# Patient Record
Sex: FEMALE | Race: WHITE | NOT HISPANIC OR LATINO | URBAN - METROPOLITAN AREA
[De-identification: names, ages, dates, MRNs, and addresses within clinical notes are randomized per-mention and may not be internally consistent; named-entity substitution may affect disease eponyms.]

---

## 2023-11-27 ENCOUNTER — ANCILLARY PROCEDURE (OUTPATIENT)
Dept: RADIOLOGY | Facility: CLINIC | Age: 68
End: 2023-11-27
Payer: MEDICARE

## 2023-11-27 VITALS — WEIGHT: 160 LBS | BODY MASS INDEX: 27.31 KG/M2 | HEIGHT: 64 IN

## 2023-11-27 DIAGNOSIS — Z12.31 ENCOUNTER FOR SCREENING MAMMOGRAM FOR MALIGNANT NEOPLASM OF BREAST: ICD-10-CM

## 2023-11-27 PROCEDURE — 77063 BREAST TOMOSYNTHESIS BI: CPT

## 2023-11-29 ENCOUNTER — LAB (OUTPATIENT)
Dept: LAB | Facility: LAB | Age: 68
End: 2023-11-29
Payer: MEDICARE

## 2023-11-29 DIAGNOSIS — Z13.89 ENCOUNTER FOR SCREENING FOR OTHER DISORDER: ICD-10-CM

## 2023-11-29 DIAGNOSIS — E78.1 PURE HYPERGLYCERIDEMIA: Primary | ICD-10-CM

## 2023-11-29 LAB
ALBUMIN SERPL-MCNC: 4.3 G/DL (ref 3.5–5)
ALP BLD-CCNC: 49 U/L (ref 35–125)
ALT SERPL-CCNC: 8 U/L (ref 5–40)
ANION GAP SERPL CALC-SCNC: 11 MMOL/L
APPEARANCE UR: CLEAR
AST SERPL-CCNC: 13 U/L (ref 5–40)
BASOPHILS # BLD AUTO: 0.06 X10*3/UL (ref 0–0.1)
BASOPHILS NFR BLD AUTO: 1.1 %
BILIRUB SERPL-MCNC: 0.6 MG/DL (ref 0.1–1.2)
BILIRUB UR STRIP.AUTO-MCNC: NEGATIVE MG/DL
BUN SERPL-MCNC: 8 MG/DL (ref 8–25)
CALCIUM SERPL-MCNC: 9.6 MG/DL (ref 8.5–10.4)
CHLORIDE SERPL-SCNC: 105 MMOL/L (ref 97–107)
CHOLEST SERPL-MCNC: 279 MG/DL (ref 133–200)
CHOLEST/HDLC SERPL: 3.2 {RATIO}
CO2 SERPL-SCNC: 25 MMOL/L (ref 24–31)
COLOR UR: COLORLESS
CREAT SERPL-MCNC: 0.7 MG/DL (ref 0.4–1.6)
EOSINOPHIL # BLD AUTO: 0.12 X10*3/UL (ref 0–0.7)
EOSINOPHIL NFR BLD AUTO: 2.1 %
ERYTHROCYTE [DISTWIDTH] IN BLOOD BY AUTOMATED COUNT: 13.4 % (ref 11.5–14.5)
GFR SERPL CREATININE-BSD FRML MDRD: >90 ML/MIN/1.73M*2
GLUCOSE SERPL-MCNC: 88 MG/DL (ref 65–99)
GLUCOSE UR STRIP.AUTO-MCNC: NORMAL MG/DL
HCT VFR BLD AUTO: 42 % (ref 36–46)
HDLC SERPL-MCNC: 86 MG/DL
HGB BLD-MCNC: 13.4 G/DL (ref 12–16)
IMM GRANULOCYTES # BLD AUTO: 0.02 X10*3/UL (ref 0–0.7)
IMM GRANULOCYTES NFR BLD AUTO: 0.4 % (ref 0–0.9)
KETONES UR STRIP.AUTO-MCNC: NEGATIVE MG/DL
LDLC SERPL CALC-MCNC: 178 MG/DL (ref 65–130)
LEUKOCYTE ESTERASE UR QL STRIP.AUTO: ABNORMAL
LYMPHOCYTES # BLD AUTO: 1.84 X10*3/UL (ref 1.2–4.8)
LYMPHOCYTES NFR BLD AUTO: 32.5 %
MCH RBC QN AUTO: 30 PG (ref 26–34)
MCHC RBC AUTO-ENTMCNC: 31.9 G/DL (ref 32–36)
MCV RBC AUTO: 94 FL (ref 80–100)
MONOCYTES # BLD AUTO: 0.58 X10*3/UL (ref 0.1–1)
MONOCYTES NFR BLD AUTO: 10.2 %
NEUTROPHILS # BLD AUTO: 3.05 X10*3/UL (ref 1.2–7.7)
NEUTROPHILS NFR BLD AUTO: 53.7 %
NITRITE UR QL STRIP.AUTO: NEGATIVE
NRBC BLD-RTO: 0 /100 WBCS (ref 0–0)
PH UR STRIP.AUTO: 5.5 [PH]
PLATELET # BLD AUTO: 279 X10*3/UL (ref 150–450)
POTASSIUM SERPL-SCNC: 4.4 MMOL/L (ref 3.4–5.1)
PROT SERPL-MCNC: 6.5 G/DL (ref 5.9–7.9)
PROT UR STRIP.AUTO-MCNC: NEGATIVE MG/DL
RBC # BLD AUTO: 4.47 X10*6/UL (ref 4–5.2)
RBC # UR STRIP.AUTO: NEGATIVE /UL
RBC #/AREA URNS AUTO: NORMAL /HPF
SODIUM SERPL-SCNC: 141 MMOL/L (ref 133–145)
SP GR UR STRIP.AUTO: 1.01
TRIGL SERPL-MCNC: 76 MG/DL (ref 40–150)
UROBILINOGEN UR STRIP.AUTO-MCNC: NORMAL MG/DL
WBC # BLD AUTO: 5.7 X10*3/UL (ref 4.4–11.3)
WBC #/AREA URNS AUTO: NORMAL /HPF

## 2023-11-29 PROCEDURE — 81001 URINALYSIS AUTO W/SCOPE: CPT

## 2023-11-29 PROCEDURE — 80061 LIPID PANEL: CPT

## 2023-11-29 PROCEDURE — 85025 COMPLETE CBC W/AUTO DIFF WBC: CPT

## 2023-11-29 PROCEDURE — 36415 COLL VENOUS BLD VENIPUNCTURE: CPT

## 2023-11-29 PROCEDURE — 80053 COMPREHEN METABOLIC PANEL: CPT

## 2023-12-05 RX ORDER — TIZANIDINE 2 MG/1
2 TABLET ORAL EVERY 8 HOURS PRN
COMMUNITY
Start: 2023-06-27 | End: 2023-12-14 | Stop reason: ALTCHOICE

## 2023-12-05 RX ORDER — ALENDRONATE SODIUM 10 MG/1
10 TABLET ORAL
COMMUNITY
End: 2023-12-14 | Stop reason: ALTCHOICE

## 2023-12-05 RX ORDER — NAPROXEN 500 MG/1
500 TABLET ORAL 2 TIMES DAILY PRN
COMMUNITY
Start: 2023-06-24 | End: 2023-12-14

## 2023-12-07 ENCOUNTER — APPOINTMENT (OUTPATIENT)
Dept: PRIMARY CARE | Facility: CLINIC | Age: 68
End: 2023-12-07
Payer: MEDICARE

## 2023-12-13 PROBLEM — M81.0 AGE-RELATED OSTEOPOROSIS WITHOUT CURRENT PATHOLOGICAL FRACTURE: Status: ACTIVE | Noted: 2023-12-13

## 2023-12-13 PROBLEM — E78.00 HYPERCHOLESTEROLEMIA: Status: ACTIVE | Noted: 2023-12-13

## 2023-12-13 NOTE — PROGRESS NOTES
Subjective   Patient ID: Clara Child is a 68 y.o. female who presents for Medicare Annual Wellness Visit Subsequent.    [facility]    Presents for Annual Wellness Visit. PMHx, FMHx, SHx, and Social history reviewed and updated in chart.  Advanced Care planning reviewed.  Self assessment of Health - good  Issues with ADLs - No problems with bathing, dressing or walking.  Issues with IADLs - No problems with managing finances or medications. No problems with shopping or basic home maintenance and housework.  Issues with home safety - none    # 1. CLARA is seen for her comprehensive physical exam. She continues to work  20-30 hours per week at [employer]. Hopes to work another 10 years. She was diagnosed with T2N0M0 infiltrating ductal carcinoma of the left breast 2012. She was successfully treated with chemotherapy and radiation without complication. She no longer sees oncologist. She had a normal mammogram last month.   She continues to exercise daily.  She feels well. Her dad has colon cancer and has done well. It was diagnosed when he was 80. She had colonoscopy 2020 with 5 year follow up. She had  her Shingrix vaccine completed. She also has had both of her pneumonia vaccines.  Pre draw is reviewed and discussed.   # 2. CLARA is seen today for follow-up of Hypercholesterolemia. She has been on atorvastatin in the past but has been working on her diet and she has maintained decent control. Numbers are elevated this year.  # 3. She is also here to follow-up on osteoporosis. She was started on Fosamax in 2019 and she stopped it because she did not like the difficulty with not eating around the time that she took the medication. She was not interested in any further  treatment .    Review of Systems  Review of Systems Constitutional Symptoms: She is negative for headaches, sleep disturbance.   Cardiovascular: She is negative for chest pain/pressure, palpitations, edema.   Respiratory: She is negative for shortness of  "breath, dyspnea on exertion, coughing.   Breast: She is negative for tenderness or masses   Gastrointestinal: She is negative for indigestion, abdominal pain, blood in stool. She notes that sometimes she skips a day with a bowel movement. She drinks a lot a water.  Female Genitourinary: She is negative for frequency, nocturia, pelvic pain, vaginal discharge.   Musculoskeletal: She is negative for joint pain, myalgias, cramps.   Integumentary: She is negative for skin trouble or rash.   Neurological: She is negative for headache, numbness, tingling.   Psychiatric: She is negative for depression, anxiety.   Endocrine: She is negative for polyuria, polydipsia.    Objective   /88   Pulse 71   Ht 1.651 m (5' 5\")   Wt 73.9 kg (163 lb)   SpO2 96%   BMI 27.12 kg/m²     Physical Exam  General Appearance: MIKE is in no acute distress. She is awake and alert  Head: MIKE's hair pattern is normal for patients age .   Eyes: PERRLA, EOMI, conjunctive, sclerae clear. Vision appears normal in both eyes.  Ears, Nose, Mouth, Throat:   EARS: External bilateral ears reveal normal helix, tragus and ear lobe. Both canals are normal. Tympanic membranes are clear and intact. Hearing is grossly normal   She has a paper mask in place.  Neck: Inspection of the neck reveals no masses or JVD.   Palpation shows normal thyroid gland. No carotid bruit present.   Anterior cervical lymph node chains are unremarkable. Posterior chains are unremarkable.  Chest: Lungs are clear to auscultation without wheezes, rales, or rhonchi.  Cardiovascular: Heart is RRR, normal S1, S2. No murmurs or gallops. DP pulses are present. Extremities: No edema  Breast: Breasts are normal and symmetric . Breast tissue is normal with no significant masses. Axilla has no lymphadenopathy.   Abdomen: soft, nontender, no organomegaly or masses.   Lymph Nodes: Palpation of the inguinal area are within normal limit.  Musculoskeletal: Extremities: Full Range of motion " and strength throughout.   Skin: Skin has no bruising, rash, eruptions, or abnormal appearing lesions. She has a tattoo on her posterior left shoulder in memory of her daughter who  in 2017.  Neurological: Intact and non-focal. Cranial nerves II - XII are grossly intact. Motor exams reveals normal tone and strength ,  Psychiatric:  Patient's mood is normal with good eye contact. Affect is appropriate.    Assessment/Plan   Diagnoses and all orders for this visit:  Well adult exam  Hypercholesterolemia  -     CT cardiac scoring wo IV contrast; Future  Age-related osteoporosis without current pathological fracture  Abnormal EKG  -     ECG 12 Lead  Postmenopausal status (age-related) (natural)  -     XR DEXA bone density; Future  Other orders  -     Follow Up In Primary Care - Medicare Annual; Future  We discussed restarting the atorvastatin.  As an alternative we discussed doing a coronary calcium score.  She would like to do the coronary calcium score and hold off on medications at this time.  She is following a low-fat diet.  She agrees to get a DEXA scan and consider Prolia which we discussed in the office.  I will notify her of these results and we can discuss these over the phone.  Diet and activity was discussed.  She should follow-up in 1 year or as needed sooner.

## 2023-12-14 ENCOUNTER — OFFICE VISIT (OUTPATIENT)
Dept: PRIMARY CARE | Facility: CLINIC | Age: 68
End: 2023-12-14
Payer: MEDICARE

## 2023-12-14 VITALS
HEIGHT: 65 IN | OXYGEN SATURATION: 96 % | HEART RATE: 71 BPM | DIASTOLIC BLOOD PRESSURE: 88 MMHG | SYSTOLIC BLOOD PRESSURE: 146 MMHG | BODY MASS INDEX: 27.16 KG/M2 | WEIGHT: 163 LBS

## 2023-12-14 DIAGNOSIS — M81.0 AGE-RELATED OSTEOPOROSIS WITHOUT CURRENT PATHOLOGICAL FRACTURE: ICD-10-CM

## 2023-12-14 DIAGNOSIS — R94.31 ABNORMAL EKG: ICD-10-CM

## 2023-12-14 DIAGNOSIS — Z78.0 POSTMENOPAUSAL STATUS (AGE-RELATED) (NATURAL): ICD-10-CM

## 2023-12-14 DIAGNOSIS — Z00.00 WELL ADULT EXAM: Primary | ICD-10-CM

## 2023-12-14 DIAGNOSIS — E78.00 HYPERCHOLESTEROLEMIA: ICD-10-CM

## 2023-12-14 PROCEDURE — 1159F MED LIST DOCD IN RCRD: CPT | Performed by: FAMILY MEDICINE

## 2023-12-14 PROCEDURE — 1036F TOBACCO NON-USER: CPT | Performed by: FAMILY MEDICINE

## 2023-12-14 PROCEDURE — 1160F RVW MEDS BY RX/DR IN RCRD: CPT | Performed by: FAMILY MEDICINE

## 2023-12-14 PROCEDURE — 1126F AMNT PAIN NOTED NONE PRSNT: CPT | Performed by: FAMILY MEDICINE

## 2023-12-14 PROCEDURE — 99213 OFFICE O/P EST LOW 20 MIN: CPT | Performed by: FAMILY MEDICINE

## 2023-12-14 PROCEDURE — 93000 ELECTROCARDIOGRAM COMPLETE: CPT | Performed by: FAMILY MEDICINE

## 2023-12-14 PROCEDURE — G0439 PPPS, SUBSEQ VISIT: HCPCS | Performed by: FAMILY MEDICINE

## 2023-12-14 ASSESSMENT — PATIENT HEALTH QUESTIONNAIRE - PHQ9
SUM OF ALL RESPONSES TO PHQ9 QUESTIONS 1 AND 2: 0
2. FEELING DOWN, DEPRESSED OR HOPELESS: NOT AT ALL
1. LITTLE INTEREST OR PLEASURE IN DOING THINGS: NOT AT ALL

## 2023-12-14 ASSESSMENT — PAIN SCALES - GENERAL: PAINLEVEL: 0-NO PAIN

## 2023-12-14 NOTE — PATIENT INSTRUCTIONS
Please schedule the coronary calcium score and the bone density as we discussed.  We will discuss those results once they are available.  Have a Jacqueline Morenos!

## 2023-12-20 ENCOUNTER — ANCILLARY PROCEDURE (OUTPATIENT)
Dept: RADIOLOGY | Facility: CLINIC | Age: 68
End: 2023-12-20
Payer: MEDICARE

## 2023-12-20 DIAGNOSIS — Z78.0 POSTMENOPAUSAL STATUS (AGE-RELATED) (NATURAL): ICD-10-CM

## 2023-12-20 PROCEDURE — 77080 DXA BONE DENSITY AXIAL: CPT

## 2023-12-20 PROCEDURE — 77080 DXA BONE DENSITY AXIAL: CPT | Performed by: RADIOLOGY

## 2023-12-26 ENCOUNTER — TELEPHONE (OUTPATIENT)
Dept: PRIMARY CARE | Facility: CLINIC | Age: 68
End: 2023-12-26
Payer: MEDICARE

## 2023-12-26 NOTE — TELEPHONE ENCOUNTER
Spoke with patient re: start Prolia.      [employer] benefits verfication is currently closed through 1/7/24.  Will start verify process on 1/1/2024 and contact patient when benefits search is complete.

## 2023-12-27 ENCOUNTER — HOSPITAL ENCOUNTER (OUTPATIENT)
Dept: RADIOLOGY | Facility: HOSPITAL | Age: 68
Discharge: HOME | End: 2023-12-27
Payer: MEDICARE

## 2023-12-27 DIAGNOSIS — E78.00 HYPERCHOLESTEROLEMIA: ICD-10-CM

## 2023-12-27 PROCEDURE — 75571 CT HRT W/O DYE W/CA TEST: CPT

## 2024-01-16 DIAGNOSIS — M81.0 OSTEOPOROSIS, POSTMENOPAUSAL: Primary | ICD-10-CM

## 2024-01-18 ENCOUNTER — LAB (OUTPATIENT)
Dept: LAB | Facility: LAB | Age: 69
End: 2024-01-18
Payer: MEDICARE

## 2024-01-18 DIAGNOSIS — M81.0 OSTEOPOROSIS, POSTMENOPAUSAL: ICD-10-CM

## 2024-01-18 DIAGNOSIS — M81.0 OSTEOPOROSIS, POST-MENOPAUSAL: ICD-10-CM

## 2024-01-18 DIAGNOSIS — M81.0 OSTEOPOROSIS, POST-MENOPAUSAL: Primary | ICD-10-CM

## 2024-01-18 LAB
ANION GAP SERPL CALC-SCNC: 9 MMOL/L
BUN SERPL-MCNC: 13 MG/DL (ref 8–25)
CALCIUM SERPL-MCNC: 9.7 MG/DL (ref 8.5–10.4)
CHLORIDE SERPL-SCNC: 100 MMOL/L (ref 97–107)
CO2 SERPL-SCNC: 27 MMOL/L (ref 24–31)
CREAT SERPL-MCNC: 0.8 MG/DL (ref 0.4–1.6)
EGFRCR SERPLBLD CKD-EPI 2021: 79 ML/MIN/1.73M*2
GLUCOSE SERPL-MCNC: 94 MG/DL (ref 65–99)
POTASSIUM SERPL-SCNC: 5.2 MMOL/L (ref 3.4–5.1)
SODIUM SERPL-SCNC: 136 MMOL/L (ref 133–145)

## 2024-01-18 PROCEDURE — 36415 COLL VENOUS BLD VENIPUNCTURE: CPT

## 2024-01-18 PROCEDURE — 80048 BASIC METABOLIC PNL TOTAL CA: CPT

## 2024-01-18 PROCEDURE — RXMED WILLOW AMBULATORY MEDICATION CHARGE

## 2024-01-22 ENCOUNTER — PHARMACY VISIT (OUTPATIENT)
Dept: PHARMACY | Facility: CLINIC | Age: 69
End: 2024-01-22
Payer: COMMERCIAL

## 2024-01-26 ENCOUNTER — CLINICAL SUPPORT (OUTPATIENT)
Dept: PRIMARY CARE | Facility: CLINIC | Age: 69
End: 2024-01-26
Payer: MEDICARE

## 2024-01-26 ENCOUNTER — TELEPHONE (OUTPATIENT)
Dept: PRIMARY CARE | Facility: CLINIC | Age: 69
End: 2024-01-26

## 2024-01-26 VITALS — SYSTOLIC BLOOD PRESSURE: 114 MMHG | TEMPERATURE: 97.6 F | DIASTOLIC BLOOD PRESSURE: 82 MMHG

## 2024-01-26 DIAGNOSIS — M81.0 AGE-RELATED OSTEOPOROSIS WITHOUT CURRENT PATHOLOGICAL FRACTURE: Primary | ICD-10-CM

## 2024-01-26 PROCEDURE — 96372 THER/PROPH/DIAG INJ SC/IM: CPT | Performed by: FAMILY MEDICINE

## 2024-06-14 ENCOUNTER — TELEPHONE (OUTPATIENT)
Dept: PRIMARY CARE | Facility: CLINIC | Age: 69
End: 2024-06-14
Payer: MEDICARE

## 2024-06-14 DIAGNOSIS — Z12.31 VISIT FOR SCREENING MAMMOGRAM: Primary | ICD-10-CM

## 2024-06-14 DIAGNOSIS — E78.00 HYPERCHOLESTEROLEMIA: ICD-10-CM

## 2024-07-01 ENCOUNTER — TELEMEDICINE (OUTPATIENT)
Dept: PHARMACY | Facility: HOSPITAL | Age: 69
End: 2024-07-01
Payer: MEDICARE

## 2024-07-01 ENCOUNTER — SPECIALTY PHARMACY (OUTPATIENT)
Dept: PHARMACY | Facility: CLINIC | Age: 69
End: 2024-07-01

## 2024-07-01 DIAGNOSIS — M81.0 OSTEOPOROSIS, POSTMENOPAUSAL: Primary | ICD-10-CM

## 2024-07-01 DIAGNOSIS — M81.0 AGE-RELATED OSTEOPOROSIS WITHOUT CURRENT PATHOLOGICAL FRACTURE: Primary | ICD-10-CM

## 2024-07-01 DIAGNOSIS — M81.0 OSTEOPOROSIS, POST-MENOPAUSAL: ICD-10-CM

## 2024-07-01 DIAGNOSIS — M81.0 AGE-RELATED OSTEOPOROSIS WITHOUT CURRENT PATHOLOGICAL FRACTURE: ICD-10-CM

## 2024-07-01 PROCEDURE — RXMED WILLOW AMBULATORY MEDICATION CHARGE

## 2024-07-01 NOTE — PROGRESS NOTES
Please review for internal Ventures Assistance Fund (VAF) eligibility. Prescriptions have been sent to [facility].     Clara Child  1955   [id number]  [phone]   [email or web address]  Delivery Preference (if known): MAIL  Priority:  Hold Medication until [facility] approved/denied  Filing Status: Patient does file taxes  Household size: 1  Financial Documents To Be Collected By: Documents attached to email  Medication(s):    Prolia    *I have confirmed the above medications are listed on the current [facility] formulary: [email or web address]    I acknowledge the [facility] staff will further reach out to the patient to confirm additional details as needed, including but not limited to collecting financial documentation, confirming delivery information, obtaining payment method for non-VAF medications.      Sandra Robbins, [facility]

## 2024-07-01 NOTE — PROGRESS NOTES
MEDICATION MANAGEMENT    Clara Child is a 69 y.o. female who was referred by Roro Bailey MD to complete medication reconciliation and review with the clinical pharmacist.  A comprehensive medication review was completed with the patient via telephone today.  With patient's permission, this is a Telemedicine visit with audio. Provider located at office address. Patient located at their home address. All issues as documented below were discussed and addressed but limited physical exam was performed. If it was felt that the patient should be evaluated via face-to-face office appointment(s) they were directed to appropriate location.  Patient reports financial barrier with current medication.      MEDICATION HISTORY  No Known Allergies  Current Outpatient Medications on File Prior to Visit   Medication Sig Dispense Refill    [DISCONTINUED] denosumab (Prolia) 60 mg/mL syringe Inject 1 mL (60 mg total) under the skin every 6 months.  To be administered in provider's office. 1 mL 1     No current facility-administered medications on file prior to visit.        Patient Assistance Screening (VAF)  Patient verbally reports monthly or yearly income which is less than 400% federal poverty level.  Application for program has been submitted for the following medications:     Prolia    Patient has been informed that program team will be reaching out to them to discuss necessary documentation, instructed to answer phone/return voicemail.   Patient aware this process may take up to 6 weeks.   If approved medication must be filled through [facility] pharmacy and may be picked up or mailed to patient.       LABS  There were no vitals taken for this visit.   Lab Results   Component Value Date    HGBA1C 5.3 09/27/2021    HGBA1C 5.6 09/13/2018     Lab Results   Component Value Date    BILITOT 0.6 11/29/2023    CALCIUM 9.7 01/18/2024    CO2 27 01/18/2024     01/18/2024    CREATININE 0.80 01/18/2024    GLUCOSE 94  01/18/2024    ALKPHOS 49 11/29/2023    K 5.2 (H) 01/18/2024    PROT 6.5 11/29/2023     01/18/2024    AST 13 11/29/2023    ALT 8 11/29/2023    BUN 13 01/18/2024    ANIONGAP 9 01/18/2024    MG 1.7 04/29/2021    PHOS 2.7 04/29/2021    ALBUMIN 4.3 11/29/2023     Lab Results   Component Value Date    TRIG 76 11/29/2023    CHOL 279 (H) 11/29/2023    LDLCALC 178 (H) 11/29/2023    HDL 86.0 11/29/2023         Assessment/Plan    Comments/Recommendations to PCP:  Reconciled medications with patient via telephone today.  Reviewed instructions, MOA, SE and dose for Prolia   Patient verbalizes understanding regarding plan of care and all questions answered   4.   Pt will follow up with PCP as scheduled          Sandra Robbins [facility]    Verbal consent to manage patient's drug therapy was obtained from the patient and/or an individual authorized to act on behalf of a patient. They were informed they may decline to participate or withdraw from participation in pharmacy services at any time.

## 2024-07-01 NOTE — PROGRESS NOTES
Called pt to schedule Prolia injection.  [facility]  Pt will bring 2023 tax forms.

## 2024-07-01 NOTE — PROGRESS NOTES
PROLIA NOTES  Prolia Injection  , M81.0    Clara Child is a 69 y.o. female who was referred by Roro Bailey MD for in-office injection of Prolia.   Spoke with patiet via telephone today.  Patient verbally consented to proceed with Osteoporosis treatment/Prolia.     Calcium   Date Value Ref Range Status   01/18/2024 9.7 8.5 - 10.4 mg/dL Final     eGFR   Date Value Ref Range Status   01/18/2024 79 >60 mL/min/1.73m*2 Final     Comment:     Calculations of estimated GFR are performed using the 2021 CKD-EPI Study Refit equation without the race variable for the IDMS-Traceable creatinine methods.  https://jasn.asnjournals.org/content/early/2021/09/22/ASN.9143264771        Last DEXA scan:  12/14/2023    FINDINGS:  LEFT FEMUR -TOTAL  Bone Mineral Density:0.724 g/cm2.  T-Score -2.3  Z-Score -1.0  % change vs Previous : na. % change vs Baseline baseline.      LEFT FEMUR -NECK  Bone Mineral Density:  0.672 g/cm2.  T-Score -2.6   Z-Score-1.1  % change vs Previous : na. % change vs Baseline baseline.      SPINE L1-L4  Bone Mineral Density:0.890 g/cm2.  T-Score -2.3  Z-Score -1.0  % change vs Previous : na. % change vs Baseline baseline.      World Health Organization (WHO) criteria for post-menopausal,   Women:  Normal:         T-score at or above -1 SD  Osteopenia:   T-score between -1 and -2.5 SD  Osteoporosis: T-score at or below -2.5 SD       10-year Fracture Risk:  Major Osteoporotic Fracture  15.8  Hip Fracture        Next dose due: 07/26/24      PA needed? No    Prolia injection via: [facility]  Authorization number: n/a    Pharmacy to be used: [facility]     Bloodwork ordered? Yes    Date: 07/01/24  Medication ordered? Yes    Date: 07/01/24  Medication Received? No    Appointment scheduled? Yes    Date: 07/26/24 at CPE      Other Notes:  Discussed cost, MOA, potential SE, pre-injection bloodwork, post injection bloodwork, instructed to call office after injection with any signs of hypocalcemia.

## 2024-07-02 ENCOUNTER — PHARMACY VISIT (OUTPATIENT)
Dept: PHARMACY | Facility: CLINIC | Age: 69
End: 2024-07-02
Payer: COMMERCIAL

## 2024-07-26 ENCOUNTER — APPOINTMENT (OUTPATIENT)
Dept: PRIMARY CARE | Facility: CLINIC | Age: 69
End: 2024-07-26
Payer: MEDICARE

## 2024-07-26 VITALS — DIASTOLIC BLOOD PRESSURE: 72 MMHG | SYSTOLIC BLOOD PRESSURE: 116 MMHG | TEMPERATURE: 97.5 F

## 2024-07-26 DIAGNOSIS — M81.0 AGE-RELATED OSTEOPOROSIS WITHOUT CURRENT PATHOLOGICAL FRACTURE: Primary | ICD-10-CM

## 2024-07-26 PROCEDURE — 96372 THER/PROPH/DIAG INJ SC/IM: CPT | Performed by: FAMILY MEDICINE

## 2024-07-26 NOTE — PROGRESS NOTES
Prolia Injection  , M81.0    Clara Child is a 69 y.o. female who was referred by Roro Bailey MD for in-office injection of Prolia.   Patient verbally consented to receive Prolia.      Injection given today and documented in medical record.  Patient was provided with Prolia REMS handout and instructed to call office with any signs of hypocalcemia.    Prolia injection supplied by patient - medication charge IS NOT needed     [unfilled]    Lab Results       Component                Value               Date

## 2024-08-13 ENCOUNTER — LAB (OUTPATIENT)
Dept: LAB | Facility: LAB | Age: 69
End: 2024-08-13
Payer: MEDICARE

## 2024-08-13 DIAGNOSIS — M81.0 OSTEOPOROSIS, POST-MENOPAUSAL: ICD-10-CM

## 2024-08-13 DIAGNOSIS — M81.0 OSTEOPOROSIS, POSTMENOPAUSAL: ICD-10-CM

## 2024-08-13 LAB
25(OH)D3 SERPL-MCNC: 27 NG/ML (ref 31–100)
ANION GAP SERPL CALC-SCNC: 11 MMOL/L
BUN SERPL-MCNC: 11 MG/DL (ref 8–25)
CALCIUM SERPL-MCNC: 9.6 MG/DL (ref 8.5–10.4)
CHLORIDE SERPL-SCNC: 105 MMOL/L (ref 97–107)
CO2 SERPL-SCNC: 26 MMOL/L (ref 24–31)
CREAT SERPL-MCNC: 0.7 MG/DL (ref 0.4–1.6)
EGFRCR SERPLBLD CKD-EPI 2021: >90 ML/MIN/1.73M*2
GLUCOSE SERPL-MCNC: 83 MG/DL (ref 65–99)
POTASSIUM SERPL-SCNC: 5.2 MMOL/L (ref 3.4–5.1)
SODIUM SERPL-SCNC: 142 MMOL/L (ref 133–145)

## 2024-08-13 PROCEDURE — 82306 VITAMIN D 25 HYDROXY: CPT

## 2024-08-13 PROCEDURE — 80048 BASIC METABOLIC PNL TOTAL CA: CPT

## 2024-08-13 PROCEDURE — 36415 COLL VENOUS BLD VENIPUNCTURE: CPT

## 2024-08-30 ENCOUNTER — DOCUMENTATION WITH CHARGES (OUTPATIENT)
Dept: PRIMARY CARE | Facility: CLINIC | Age: 69
End: 2024-08-30

## 2024-11-27 ENCOUNTER — LAB (OUTPATIENT)
Dept: LAB | Facility: LAB | Age: 69
End: 2024-11-27
Payer: MEDICARE

## 2024-11-27 ENCOUNTER — HOSPITAL ENCOUNTER (OUTPATIENT)
Dept: RADIOLOGY | Facility: CLINIC | Age: 69
End: 2024-11-27
Payer: MEDICARE

## 2024-11-27 DIAGNOSIS — E78.00 HYPERCHOLESTEROLEMIA: ICD-10-CM

## 2024-11-27 LAB
ALBUMIN SERPL BCP-MCNC: 4 G/DL (ref 3.4–5)
ALP SERPL-CCNC: 29 U/L (ref 33–136)
ALT SERPL W P-5'-P-CCNC: 9 U/L (ref 7–45)
ANION GAP SERPL CALCULATED.3IONS-SCNC: 8 MMOL/L (ref 10–20)
APPEARANCE UR: CLEAR
AST SERPL W P-5'-P-CCNC: 13 U/L (ref 9–39)
BASOPHILS # BLD AUTO: 0.08 X10*3/UL (ref 0–0.1)
BASOPHILS NFR BLD AUTO: 1.2 %
BILIRUB SERPL-MCNC: 0.6 MG/DL (ref 0–1.2)
BILIRUB UR STRIP.AUTO-MCNC: NEGATIVE MG/DL
BUN SERPL-MCNC: 14 MG/DL (ref 6–23)
CALCIUM SERPL-MCNC: 9.1 MG/DL (ref 8.6–10.3)
CHLORIDE SERPL-SCNC: 106 MMOL/L (ref 98–107)
CHOLEST SERPL-MCNC: 232 MG/DL (ref 0–199)
CHOLEST/HDLC SERPL: 3.4 {RATIO}
CO2 SERPL-SCNC: 28 MMOL/L (ref 21–32)
COLOR UR: COLORLESS
CREAT SERPL-MCNC: 0.79 MG/DL (ref 0.5–1.05)
EGFRCR SERPLBLD CKD-EPI 2021: 80 ML/MIN/1.73M*2
EOSINOPHIL # BLD AUTO: 0.19 X10*3/UL (ref 0–0.4)
EOSINOPHIL NFR BLD AUTO: 3 %
ERYTHROCYTE [DISTWIDTH] IN BLOOD BY AUTOMATED COUNT: 13.7 % (ref 11.5–14.5)
GLUCOSE SERPL-MCNC: 79 MG/DL (ref 74–99)
GLUCOSE UR STRIP.AUTO-MCNC: NORMAL MG/DL
HCT VFR BLD AUTO: 42.4 % (ref 36–46)
HDLC SERPL-MCNC: 68.7 MG/DL
HGB BLD-MCNC: 13.6 G/DL (ref 12–16)
IMM GRANULOCYTES # BLD AUTO: 0.02 X10*3/UL (ref 0–0.5)
IMM GRANULOCYTES NFR BLD AUTO: 0.3 % (ref 0–0.9)
KETONES UR STRIP.AUTO-MCNC: NEGATIVE MG/DL
LDLC SERPL CALC-MCNC: 148 MG/DL
LEUKOCYTE ESTERASE UR QL STRIP.AUTO: ABNORMAL
LYMPHOCYTES # BLD AUTO: 2.26 X10*3/UL (ref 0.8–3)
LYMPHOCYTES NFR BLD AUTO: 35.2 %
MCH RBC QN AUTO: 30 PG (ref 26–34)
MCHC RBC AUTO-ENTMCNC: 32.1 G/DL (ref 32–36)
MCV RBC AUTO: 93 FL (ref 80–100)
MONOCYTES # BLD AUTO: 0.61 X10*3/UL (ref 0.05–0.8)
MONOCYTES NFR BLD AUTO: 9.5 %
NEUTROPHILS # BLD AUTO: 3.26 X10*3/UL (ref 1.6–5.5)
NEUTROPHILS NFR BLD AUTO: 50.8 %
NITRITE UR QL STRIP.AUTO: NEGATIVE
NON HDL CHOLESTEROL: 163 MG/DL (ref 0–149)
NRBC BLD-RTO: 0 /100 WBCS (ref 0–0)
PH UR STRIP.AUTO: 5.5 [PH]
PLATELET # BLD AUTO: 276 X10*3/UL (ref 150–450)
POTASSIUM SERPL-SCNC: 4.4 MMOL/L (ref 3.5–5.3)
PROT SERPL-MCNC: 6.2 G/DL (ref 6.4–8.2)
PROT UR STRIP.AUTO-MCNC: NEGATIVE MG/DL
RBC # BLD AUTO: 4.54 X10*6/UL (ref 4–5.2)
RBC # UR STRIP.AUTO: NEGATIVE /UL
RBC #/AREA URNS AUTO: ABNORMAL /HPF
SODIUM SERPL-SCNC: 138 MMOL/L (ref 136–145)
SP GR UR STRIP.AUTO: 1.01
TRANS CELLS #/AREA UR COMP ASSIST: ABNORMAL /HPF
TRIGL SERPL-MCNC: 76 MG/DL (ref 0–149)
UROBILINOGEN UR STRIP.AUTO-MCNC: NORMAL MG/DL
VLDL: 15 MG/DL (ref 0–40)
WBC # BLD AUTO: 6.4 X10*3/UL (ref 4.4–11.3)
WBC #/AREA URNS AUTO: ABNORMAL /HPF

## 2024-11-27 PROCEDURE — 80061 LIPID PANEL: CPT

## 2024-11-27 PROCEDURE — 80053 COMPREHEN METABOLIC PANEL: CPT

## 2024-11-27 PROCEDURE — 36415 COLL VENOUS BLD VENIPUNCTURE: CPT

## 2024-11-27 PROCEDURE — 81001 URINALYSIS AUTO W/SCOPE: CPT

## 2024-11-27 PROCEDURE — 85025 COMPLETE CBC W/AUTO DIFF WBC: CPT

## 2024-12-05 ENCOUNTER — HOSPITAL ENCOUNTER (OUTPATIENT)
Dept: RADIOLOGY | Facility: CLINIC | Age: 69
Discharge: HOME | End: 2024-12-05
Payer: MEDICARE

## 2024-12-05 DIAGNOSIS — Z12.31 VISIT FOR SCREENING MAMMOGRAM: ICD-10-CM

## 2024-12-05 PROCEDURE — 77067 SCR MAMMO BI INCL CAD: CPT

## 2024-12-12 NOTE — PROGRESS NOTES
Subjective   Reason for Visit: Clara Child is an 69 y.o. female here for a Medicare Wellness visit.     Past Medical, Surgical, and Family History reviewed and updated in chart.    Reviewed all medications by prescribing practitioner or clinical pharmacist (such as prescriptions, OTCs, herbal therapies and supplements) and documented in the medical record.    HPI    Patient Care Team:  Roro Bailey MD as PCP - General (Family Medicine)  Roro Bailey MD as PCP - [employer] PCP     # 1. CLARA is seen for her comprehensive physical exam.  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  Enjoys her job and hopes to keep working for another 10 years.   She was diagnosed with T2N0M0 infiltrating ductal carcinoma of the left breast 2012. She was successfully treated with chemotherapy and radiation without complication. She no longer sees oncologist.     She had a coronary calcium score of less than 1 in 2023.  She had a normal mammogram earlier this month.  She feels well. Her dad had colon cancer . She had colonoscopy 2020 with 5 year follow up. She had her Shingrix vaccine.    Pre draw is reviewed and discussed.   # 2. CLARA is seen today for follow-up of Hypercholesterolemia. She has been on atorvastatin in the past but has been working on her diet and she has maintained decent control.  Cholesterol ratio was 3.4.  # 3. She is also here to follow-up on osteoporosis.  DEXA scan last year showed osteoporosis.  She was started on Prolia because she had been intolerant to Fosamax in the past.    Review of Systems   Constitutional Symptoms: She is negative for headaches, sleep disturbance.   Cardiovascular: She is negative for chest pain, palpitations, edema.   Respiratory: She is negative for shortness of breath, dyspnea on exertion, coughing.   Breast: She is negative for tenderness or masses   Gastrointestinal: She is negative for indigestion, abdominal pain, blood  "in stool.  No change in bowel habits.  Female Genitourinary: She is negative for frequency, nocturia  Musculoskeletal: She is negative for joint pain, myalgias, cramps.   Integumentary: She is negative for skin trouble or rash.   Neurological: She is negative for headache, numbness, tingling.   Psychiatric: She is negative for depression, anxiety.   Endocrine: She is negative for polyuria, polydipsia.     Objective   Vitals:  /64 (BP Location: Left arm)   Pulse 67   Ht 1.64 m (5' 4.57\")   Wt 76.7 kg (169 lb)   SpO2 96%   BMI 28.50 kg/m²       Physical Exam  General Appearance: MIKE is in no acute distress. She is awake and alert  Eyes: PERRLA, EOMI, conjunctive, sclerae clear.   Ears, Nose, Mouth, Throat:   EARS: External bilateral ears reveal normal helix, tragus and ear lobe. Both canals are normal. Tympanic membranes are clear and intact.   Nose is noncongested.  Throat is noninjected and without exudate.  Neck: Inspection of the neck reveals no masses or JVD.   Palpation shows normal thyroid gland. No carotid bruit present.   No cervical adenopathy.  Chest: Lungs are clear to auscultation without wheezes, rales, or rhonchi.  Cardiovascular: Heart is RRR, normal S1, S2. No murmurs or gallops. DP pulses are present. Extremities: No edema  Breast: Breasts are normal and symmetric . Breast tissue is normal with no significant masses. Axilla has no lymphadenopathy.   Abdomen: soft, nontender, no organomegaly or masses.   Lymph Nodes: Palpation of the inguinal area are within normal limit.  Musculoskeletal: Extremities: Full Range of motion and strength throughout.   Skin: Skin has no bruising, rash, eruptions, or abnormal appearing lesions. She has a tattoo on her posterior left shoulder in memory of her daughter who  in 2017.  Neurological: Intact and non-focal. Cranial nerves II - XII are grossly intact. Motor exams reveals normal tone and strength ,  Psychiatric: Patient's mood is normal with good " eye contact. Affect is appropriate.     Assessment & Plan  Well adult exam  Diet and activity briefly discussed.     Follow-up in 1 year or as needed sooner.  Hypercholesterolemia  She should continue a low-fat diet.       Age-related osteoporosis without current pathological fracture  She will continue the Prolia for 5 years.

## 2024-12-17 ENCOUNTER — TELEPHONE (OUTPATIENT)
Dept: PRIMARY CARE | Facility: CLINIC | Age: 69
End: 2024-12-17

## 2024-12-17 ENCOUNTER — APPOINTMENT (OUTPATIENT)
Dept: PRIMARY CARE | Facility: CLINIC | Age: 69
End: 2024-12-17
Payer: MEDICARE

## 2024-12-17 VITALS
OXYGEN SATURATION: 96 % | WEIGHT: 169 LBS | BODY MASS INDEX: 28.16 KG/M2 | DIASTOLIC BLOOD PRESSURE: 64 MMHG | HEIGHT: 65 IN | SYSTOLIC BLOOD PRESSURE: 104 MMHG | HEART RATE: 67 BPM

## 2024-12-17 DIAGNOSIS — E78.00 HYPERCHOLESTEROLEMIA: ICD-10-CM

## 2024-12-17 DIAGNOSIS — M81.0 AGE-RELATED OSTEOPOROSIS WITHOUT CURRENT PATHOLOGICAL FRACTURE: ICD-10-CM

## 2024-12-17 DIAGNOSIS — Z00.00 WELL ADULT EXAM: Primary | ICD-10-CM

## 2024-12-17 DIAGNOSIS — Z00.00 WELL ADULT EXAM: ICD-10-CM

## 2024-12-17 PROCEDURE — 1160F RVW MEDS BY RX/DR IN RCRD: CPT | Performed by: FAMILY MEDICINE

## 2024-12-17 PROCEDURE — 3008F BODY MASS INDEX DOCD: CPT | Performed by: FAMILY MEDICINE

## 2024-12-17 PROCEDURE — 1170F FXNL STATUS ASSESSED: CPT | Performed by: FAMILY MEDICINE

## 2024-12-17 PROCEDURE — 1036F TOBACCO NON-USER: CPT | Performed by: FAMILY MEDICINE

## 2024-12-17 PROCEDURE — 1126F AMNT PAIN NOTED NONE PRSNT: CPT | Performed by: FAMILY MEDICINE

## 2024-12-17 PROCEDURE — G0439 PPPS, SUBSEQ VISIT: HCPCS | Performed by: FAMILY MEDICINE

## 2024-12-17 PROCEDURE — 1159F MED LIST DOCD IN RCRD: CPT | Performed by: FAMILY MEDICINE

## 2024-12-17 ASSESSMENT — ACTIVITIES OF DAILY LIVING (ADL)
GROCERY_SHOPPING: INDEPENDENT
MANAGING_FINANCES: INDEPENDENT
DRESSING: INDEPENDENT
DOING_HOUSEWORK: INDEPENDENT
BATHING: INDEPENDENT
TAKING_MEDICATION: INDEPENDENT

## 2024-12-17 ASSESSMENT — PAIN SCALES - GENERAL: PAINLEVEL_OUTOF10: 0-NO PAIN

## 2024-12-17 NOTE — PATIENT INSTRUCTIONS
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  I am glad you are enjoying your job as well as your lunches with your granddaughter!    Jacqueline Crystal!

## 2025-01-08 PROCEDURE — RXMED WILLOW AMBULATORY MEDICATION CHARGE

## 2025-01-10 ENCOUNTER — PHARMACY VISIT (OUTPATIENT)
Dept: PHARMACY | Facility: CLINIC | Age: 70
End: 2025-01-10
Payer: COMMERCIAL

## 2025-01-13 ENCOUNTER — TELEPHONE (OUTPATIENT)
Dept: PRIMARY CARE | Facility: CLINIC | Age: 70
End: 2025-01-13
Payer: MEDICARE

## 2025-01-24 PROCEDURE — RXMED WILLOW AMBULATORY MEDICATION CHARGE

## 2025-01-26 ENCOUNTER — DOCUMENTATION (OUTPATIENT)
Dept: PRIMARY CARE | Facility: CLINIC | Age: 70
End: 2025-01-26
Payer: MEDICARE

## 2025-01-26 DIAGNOSIS — M81.0 OSTEOPOROSIS, POSTMENOPAUSAL: Primary | ICD-10-CM

## 2025-01-26 NOTE — PROGRESS NOTES
PROLIA NOTES  Prolia Injection  , M81.0    Clara Child is a 69 y.o. female who was referred by Roro Araujo MD for in-office injection of Prolia.   Spoke with patiet via telephone today.  Patient verbally consented to proceed with Osteoporosis treatment/Prolia.     Calcium   Date Value Ref Range Status   11/27/2024 9.1 8.6 - 10.3 mg/dL Final     Vitamin D, 25-Hydroxy, Total   Date Value Ref Range Status   08/13/2024 27 (L) 31 - 100 ng/mL Final     eGFR   Date Value Ref Range Status   11/27/2024 80 >60 mL/min/1.73m*2 Final     Comment:     Calculations of estimated GFR are performed using the 2021 CKD-EPI Study Refit equation without the race variable for the IDMS-Traceable creatinine methods.  https://jasn.asnjournals.org/content/early/2021/09/22/ASN.6719612213        Last DEXA scan:  12/20/2023    FINDINGS:   Narrative & Impression   Interpreted By:  Channing Orellana,   STUDY:  DEXA BONE [id number]/20/2023 10:00 am      INDICATION:  Signs/Symptoms:screening. The patient is a 67 y/o  year old F.      COMPARISON:  None.      ACCESSION NUMBER(S):  [id number]      ORDERING CLINICIAN:  RORO ARAUJO      TECHNIQUE:  DEXA BONE DENSITY      FINDINGS:  LEFT FEMUR -TOTAL  Bone Mineral Density:0.724 g/cm2.  T-Score -2.3  Z-Score -1.0  % change vs Previous : na. % change vs Baseline baseline.      LEFT FEMUR -NECK  Bone Mineral Density:  0.672 g/cm2.  T-Score -2.6   Z-Score-1.1  % change vs Previous : na. % change vs Baseline baseline.      SPINE L1-L4  Bone Mineral Density:0.890 g/cm2.  T-Score -2.3  Z-Score -1.0  % change vs Previous : na. % change vs Baseline baseline.      World Health Organization (WHO) criteria for post-menopausal,   Women:  Normal:         T-score at or above -1 SD  Osteopenia:   T-score between -1 and -2.5 SD  Osteoporosis: T-score at or below -2.5 SD          10-year Fracture Risk:  Major Osteoporotic Fracture  15.8  Hip Fracture                        4.4           IMPRESSION:  According to World Health Organization criteria, classification is  osteoporosis.           Next dose due: 01/27/25    PA needed? No    Prolia injection via:  Pharmacy, [facility] approved   Authorization number:    Pharmacy to be used:  [facility]       Other Notes:  Discussed cost, MOA, potential SE, pre-injection bloodwork, post injection bloodwork, instructed to call office after injection with any signs of hypocalcemia.

## 2025-01-28 ENCOUNTER — APPOINTMENT (OUTPATIENT)
Dept: PHARMACY | Facility: HOSPITAL | Age: 70
End: 2025-01-28
Payer: MEDICARE

## 2025-01-30 ENCOUNTER — PHARMACY VISIT (OUTPATIENT)
Dept: PHARMACY | Facility: CLINIC | Age: 70
End: 2025-01-30
Payer: COMMERCIAL

## 2025-02-03 ENCOUNTER — APPOINTMENT (OUTPATIENT)
Dept: PRIMARY CARE | Facility: CLINIC | Age: 70
End: 2025-02-03
Payer: MEDICARE

## 2025-02-03 VITALS
BODY MASS INDEX: 28.16 KG/M2 | SYSTOLIC BLOOD PRESSURE: 137 MMHG | WEIGHT: 169 LBS | DIASTOLIC BLOOD PRESSURE: 80 MMHG | OXYGEN SATURATION: 97 % | HEART RATE: 85 BPM | TEMPERATURE: 98 F | HEIGHT: 65 IN

## 2025-02-03 PROCEDURE — 96372 THER/PROPH/DIAG INJ SC/IM: CPT | Performed by: FAMILY MEDICINE

## 2025-02-03 NOTE — PROGRESS NOTES
Prolia Injection  , M81.0    Clara Child is a 69 y.o. female who was referred by Roro Bailey MD for in-office injection of Prolia.   Patient verbally consented to receive Prolia.      Injection given today and documented in medical record.  Patient was provided with Prolia REMS handout and instructed to call office with any signs of hypocalcemia.    Prolia injection supplied by patient - medication charge IS NOT needed       Lab Results   Component Value Date    CALCIUM 9.1 11/27/2024    CALCIUM 9.6 08/13/2024    EGFR 80 11/27/2024    EGFR >90 08/13/2024         Cande Loyola MA

## 2025-03-26 ENCOUNTER — OFFICE VISIT (OUTPATIENT)
Dept: PRIMARY CARE | Facility: CLINIC | Age: 70
End: 2025-03-26
Payer: MEDICARE

## 2025-03-26 VITALS
SYSTOLIC BLOOD PRESSURE: 132 MMHG | OXYGEN SATURATION: 98 % | WEIGHT: 173 LBS | HEIGHT: 64 IN | HEART RATE: 62 BPM | BODY MASS INDEX: 29.53 KG/M2 | DIASTOLIC BLOOD PRESSURE: 80 MMHG

## 2025-03-26 DIAGNOSIS — N81.10 VAGINAL PROLAPSE: Primary | ICD-10-CM

## 2025-03-26 PROCEDURE — 1160F RVW MEDS BY RX/DR IN RCRD: CPT

## 2025-03-26 PROCEDURE — 1036F TOBACCO NON-USER: CPT

## 2025-03-26 PROCEDURE — 1123F ACP DISCUSS/DSCN MKR DOCD: CPT

## 2025-03-26 PROCEDURE — 1159F MED LIST DOCD IN RCRD: CPT

## 2025-03-26 PROCEDURE — 3008F BODY MASS INDEX DOCD: CPT

## 2025-03-26 PROCEDURE — 1126F AMNT PAIN NOTED NONE PRSNT: CPT

## 2025-03-26 PROCEDURE — 99212 OFFICE O/P EST SF 10 MIN: CPT

## 2025-03-26 ASSESSMENT — ENCOUNTER SYMPTOMS
FLANK PAIN: 0
FEVER: 0
DIFFICULTY URINATING: 0
DIARRHEA: 0
FREQUENCY: 0
DYSURIA: 0
CONSTIPATION: 0
ABDOMINAL PAIN: 0
WOUND: 0
CHILLS: 0

## 2025-03-26 ASSESSMENT — LIFESTYLE VARIABLES
HOW OFTEN DO YOU HAVE SIX OR MORE DRINKS ON ONE OCCASION: NEVER
SKIP TO QUESTIONS 9-10: 1
HOW OFTEN DO YOU HAVE A DRINK CONTAINING ALCOHOL: NEVER
AUDIT-C TOTAL SCORE: 0
HOW MANY STANDARD DRINKS CONTAINING ALCOHOL DO YOU HAVE ON A TYPICAL DAY: PATIENT DOES NOT DRINK

## 2025-03-26 ASSESSMENT — PATIENT HEALTH QUESTIONNAIRE - PHQ9
1. LITTLE INTEREST OR PLEASURE IN DOING THINGS: NOT AT ALL
2. FEELING DOWN, DEPRESSED OR HOPELESS: NOT AT ALL
SUM OF ALL RESPONSES TO PHQ9 QUESTIONS 1 AND 2: 0

## 2025-03-26 ASSESSMENT — PAIN SCALES - GENERAL: PAINLEVEL_OUTOF10: 0-NO PAIN

## 2025-03-26 ASSESSMENT — COLUMBIA-SUICIDE SEVERITY RATING SCALE - C-SSRS: 1. IN THE PAST MONTH, HAVE YOU WISHED YOU WERE DEAD OR WISHED YOU COULD GO TO SLEEP AND NOT WAKE UP?: NO

## 2025-03-26 NOTE — PROGRESS NOTES
"Subjective   Patient ID: Clara Child is a 69 y.o. female who presents for Vaginal Prolapse (Patient is in office today for a vaginal prolapse .).    Clara is a 70 yo female presenting for concerns of vaginal prolapse.   Started noticing about a month ago - felt like a wedgie. Bulging out at night - pretty good size.  Able to reduce it manually.   Comes and goes - worse with walking around and activity  No pain, no bleeding   No difficulty with urinating or bowel movements   2 kids - vaginal deliveries       Patient Care Team:  Roro Bailey MD as PCP - General (Family Medicine)  Roro Bailey MD as PCP - [employer] PCP    PMH, PSH, family history and social history were reviewed and updated.    Review of Systems   Constitutional:  Negative for chills and fever.   HENT: Negative.     Gastrointestinal:  Negative for abdominal pain, constipation and diarrhea.   Genitourinary:  Negative for decreased urine volume, difficulty urinating, dysuria, flank pain, frequency, pelvic pain, urgency, vaginal bleeding, vaginal discharge and vaginal pain.   Skin:  Negative for rash and wound.       Objective   /80   Pulse 62   Ht 1.626 m (5' 4\")   Wt 78.5 kg (173 lb)   SpO2 98%   BMI 29.70 kg/m²     Physical Exam  Vitals and nursing note reviewed.   Constitutional:       General: She is not in acute distress.     Appearance: Normal appearance. She is not ill-appearing.   HENT:      Head: Normocephalic and atraumatic.   Cardiovascular:      Rate and Rhythm: Normal rate and regular rhythm.      Heart sounds: Normal heart sounds.   Pulmonary:      Effort: Pulmonary effort is normal.      Breath sounds: Normal breath sounds.   Genitourinary:     General: Normal vulva.      Vagina: No vaginal discharge.      Rectum: Normal.      Comments: Small protruding mass from anterior wall, easily reducible- not erythematous, no lesions, no pain.   Neurological:      Mental Status: She is alert and " oriented to person, place, and time.   Psychiatric:         Mood and Affect: Mood normal.         Behavior: Behavior normal.         Assessment/Plan   Assessment & Plan  Vaginal prolapse    Orders:    Referral to Urogynecology; Future    Discussed options for treatment including pessary and surgery.     Patient advised to keep area moist and attempt to avoid friction. Patient advised that problem is mostly bothersome and treatment depends on her preferences.     Patient referred to urogynecology for further treatment.     If she develops pain, difficulty urinating or passing bowel movements she will need to seek care. Patient verbalized understanding.     Follow up as scheduled, sooner with any problems or concerns.

## 2025-04-07 ENCOUNTER — OFFICE VISIT (OUTPATIENT)
Dept: OBSTETRICS AND GYNECOLOGY | Facility: CLINIC | Age: 70
End: 2025-04-07
Payer: MEDICARE

## 2025-04-07 VITALS
WEIGHT: 175.8 LBS | BODY MASS INDEX: 30.18 KG/M2 | HEART RATE: 80 BPM | SYSTOLIC BLOOD PRESSURE: 142 MMHG | DIASTOLIC BLOOD PRESSURE: 79 MMHG

## 2025-04-07 DIAGNOSIS — N81.10 VAGINAL PROLAPSE: Primary | ICD-10-CM

## 2025-04-07 DIAGNOSIS — Z46.89 FITTING AND ADJUSTMENT OF PESSARY: ICD-10-CM

## 2025-04-07 LAB
POC APPEARANCE, URINE: CLEAR
POC BILIRUBIN, URINE: NEGATIVE
POC BLOOD, URINE: NEGATIVE
POC COLOR, URINE: YELLOW
POC GLUCOSE, URINE: NEGATIVE MG/DL
POC KETONES, URINE: NEGATIVE MG/DL
POC LEUKOCYTES, URINE: ABNORMAL
POC NITRITE,URINE: NEGATIVE
POC PH, URINE: 6.5 PH
POC PROTEIN, URINE: NEGATIVE MG/DL
POC SPECIFIC GRAVITY, URINE: >=1.03
POC UROBILINOGEN, URINE: 0.2 EU/DL

## 2025-04-07 PROCEDURE — 99213 OFFICE O/P EST LOW 20 MIN: CPT | Performed by: NURSE PRACTITIONER

## 2025-04-07 PROCEDURE — 1126F AMNT PAIN NOTED NONE PRSNT: CPT | Performed by: NURSE PRACTITIONER

## 2025-04-07 PROCEDURE — 1159F MED LIST DOCD IN RCRD: CPT | Performed by: NURSE PRACTITIONER

## 2025-04-07 PROCEDURE — 57160 INSERT PESSARY/OTHER DEVICE: CPT | Performed by: NURSE PRACTITIONER

## 2025-04-07 PROCEDURE — A4562 PESSARY, NON RUBBER,ANY TYPE: HCPCS | Performed by: NURSE PRACTITIONER

## 2025-04-07 PROCEDURE — 1123F ACP DISCUSS/DSCN MKR DOCD: CPT | Performed by: NURSE PRACTITIONER

## 2025-04-07 PROCEDURE — 81003 URINALYSIS AUTO W/O SCOPE: CPT | Mod: QW | Performed by: NURSE PRACTITIONER

## 2025-04-07 ASSESSMENT — PAIN SCALES - GENERAL: PAINLEVEL_OUTOF10: 0-NO PAIN

## 2025-04-07 NOTE — PROGRESS NOTES
"69 y.o. female with c/o vaginal bulge presents as a new patient. Reports feeling a bulge coming out of her vagina, which she first noticed about a month ago while wiping. Describes a sensation as a \"wedgie feeling\" and is more pronounced after standing for long periods. The bugle is not consistently present and does not cause pain, burning, itching, or interfere with bowel movements. Denies urinary incontinence and reports better bladder than in the past. Previously experienced stress incontinence with sneezing about 8 years ago, but has lost weight since. Occasionally wakes up twice to void, other times she does not need to get up at all. Denies vaginal bleeding. Manually repositions the bulge when she feels it, does not need to push it back in to void. Currently not sexually active, her  is . . She has a hx of breast cancer, currently takes Prolia. Has a hx of tubal ligation, denies hysterectomy. Has a hx of appendectomy in  d/t ruptured appendix. Denies hx of colposcopy or LEEP.     PVR: 9 ml  UA: negative      Physical Exam  Constitutional:       Appearance: Normal appearance.   Genitourinary:      Vulva, bladder and urethral meatus normal.      No vaginal tenderness.      Anterior vaginal prolapse present.       Right Adnexa: no mass present.     Left Adnexa: no mass present.     Cervical exam comments: Cervix slightly descending.      Uterus is not enlarged or fixed.      No urethral stress urinary incontinence with cough stress test present.      Levator ani not tender.     Pelvic exam was performed with patient in the lithotomy position and standing.   HENT:      Head: Normocephalic and atraumatic.   Neurological:      General: No focal deficit present.      Mental Status: She is alert and oriented to person, place, and time.   Psychiatric:         Mood and Affect: Mood normal.         Behavior: Behavior normal.         Thought Content: Thought content normal.         Judgment: " Judgment normal.           Assessment and Plan  69 y.o. female with vaginal prolapse. Comorbidities include: hypercholesterolemia.     Diagnosis List:  #1 Vaginal prolapse    Plan:  1. Vaginal prolapse  - Prolapse management options discussed. Observation of prolapse is appropriate if not bothered by vaginal bulge, emptying bladder well and no irritation to vaginal mucosa.   Pessary insertion and management if bothered by bulge. A pessary is a vaginal device that is fit in the office and can either be managed by yourself or by a provider in the office every 3 months. A pessary does not prevent the prolapse from getting worse.  Surgical correction can always be discussed, this discussion differs for the type of prolapse, prior surgeries and medical issues.    - Fitted her with a #4 ring with support pessary which was not tolerated, it felt too big. Pessary was given to patient for potential future use.   - Fitted her with a #3 ring with support pessary that was comfortable and remained in place with standing, valsalva, bending forward, squatting, and positional changes. Procedure tolerated well without immediate complications.     Follow up in 2-3 weeks with JOSE Reyes.      Scribe Attestation  By signing my name below, Yanelis RIBEIRO Scribe, attest that this documentation has been prepared under the direction and in the presence of JOSE Reyes on 04/07/2025 at 12:28 PM.     INkechi APRN-CNP, personally performed the services described in this documentation which was scribed virtually and I confirm that it is both accurate and complete.

## 2025-04-09 ENCOUNTER — APPOINTMENT (OUTPATIENT)
Dept: OBSTETRICS AND GYNECOLOGY | Facility: CLINIC | Age: 70
End: 2025-04-09
Payer: MEDICARE

## 2025-04-10 ENCOUNTER — APPOINTMENT (OUTPATIENT)
Dept: OBSTETRICS AND GYNECOLOGY | Facility: CLINIC | Age: 70
End: 2025-04-10
Payer: MEDICARE

## 2025-04-15 ENCOUNTER — OFFICE VISIT (OUTPATIENT)
Dept: OBSTETRICS AND GYNECOLOGY | Facility: CLINIC | Age: 70
End: 2025-04-15
Payer: MEDICARE

## 2025-04-15 VITALS
DIASTOLIC BLOOD PRESSURE: 79 MMHG | HEART RATE: 76 BPM | HEIGHT: 64 IN | BODY MASS INDEX: 29.81 KG/M2 | SYSTOLIC BLOOD PRESSURE: 126 MMHG | WEIGHT: 174.6 LBS

## 2025-04-15 DIAGNOSIS — N81.10 VAGINAL PROLAPSE: Primary | ICD-10-CM

## 2025-04-15 PROCEDURE — 3008F BODY MASS INDEX DOCD: CPT | Performed by: NURSE PRACTITIONER

## 2025-04-15 PROCEDURE — 1123F ACP DISCUSS/DSCN MKR DOCD: CPT | Performed by: NURSE PRACTITIONER

## 2025-04-15 PROCEDURE — 1159F MED LIST DOCD IN RCRD: CPT | Performed by: NURSE PRACTITIONER

## 2025-04-15 PROCEDURE — 99212 OFFICE O/P EST SF 10 MIN: CPT | Performed by: NURSE PRACTITIONER

## 2025-04-15 PROCEDURE — 1126F AMNT PAIN NOTED NONE PRSNT: CPT | Performed by: NURSE PRACTITIONER

## 2025-04-15 ASSESSMENT — ENCOUNTER SYMPTOMS
EYES NEGATIVE: 1
CARDIOVASCULAR NEGATIVE: 1
PSYCHIATRIC NEGATIVE: 1
NEUROLOGICAL NEGATIVE: 1
CONSTITUTIONAL NEGATIVE: 1
HEMATOLOGIC/LYMPHATIC NEGATIVE: 1
RESPIRATORY NEGATIVE: 1
ALLERGIC/IMMUNOLOGIC NEGATIVE: 1
GASTROINTESTINAL NEGATIVE: 1
MUSCULOSKELETAL NEGATIVE: 1
ENDOCRINE NEGATIVE: 1

## 2025-04-15 ASSESSMENT — PAIN SCALES - GENERAL: PAINLEVEL_OUTOF10: 0-NO PAIN

## 2025-04-15 NOTE — PROGRESS NOTES
"Urogynecology Pessary Check Visit  Nkechi Madrigal, APRN-CNP  [phone]      History of Present Illness:    HPI    Ms. Clara Child presents for pessary check. Reports decreased prolapse discomfort after resuming water aerobics. Reports aching at the hip bones from the exercise.     Last visit: 4/7/2025   Pessary type: #3 ring with support   Bleeding?: Denies irritation or bleeding  Discomfort?: Reports pessary expelled upon returning home and having a BM. Attempted to reinsert pessary but was unsuccessful.   Bowel or bladder symptoms: No changes reported   Vaginal estrogen: None     Past medical, surgical, social, family, allergy, and medication histories were reviewed and updated in EPIC charting.       Review of Systems   Constitutional: Negative.    HENT: Negative.     Eyes: Negative.    Respiratory: Negative.     Cardiovascular: Negative.    Gastrointestinal: Negative.    Endocrine: Negative.    Genitourinary: Negative.    Musculoskeletal: Negative.    Skin: Negative.    Allergic/Immunologic: Negative.    Neurological: Negative.    Hematological: Negative.    Psychiatric/Behavioral: Negative.           Objective    /79 (Patient Position: Sitting)   Pulse 76   Ht 1.626 m (5' 4\")   Wt 79.2 kg (174 lb 9.6 oz)   BMI 29.97 kg/m²    Body mass index is 29.97 kg/m².     Physical Exam:  Physical Exam  Constitutional:       Appearance: Normal appearance.   Genitourinary:      Genitourinary Comments: Pelvic examination was not performed.   HENT:      Head: Normocephalic and atraumatic.   Neurological:      General: No focal deficit present.      Mental Status: She is alert and oriented to person, place, and time.   Psychiatric:         Mood and Affect: Mood normal.         Behavior: Behavior normal.         Thought Content: Thought content normal.         Judgment: Judgment normal.          Assessment and Plan:  69 y.o. female with vaginal prolapse. Comorbidities include: hypercholesterolemia. "     Diagnosis List:  #1 Vaginal prolapse    Plan:  1. Vaginal prolapse  - No intervention needed at this time. Pessary was not reinserted.   - Encouraged to continue water aerobics as it has been helpful. Patient will keep pessary, but will not use it unless symptoms worsen.       Follow up in 3 months with JOSE Reyes.      [place] Attestation  By signing my name below, I, [place] Sal, attest that this documentation has been prepared under the direction and in the presence of JOSE Reyes on 04/15/2025 at 8:08 PM.       JOSE Reyes

## 2025-04-17 ENCOUNTER — APPOINTMENT (OUTPATIENT)
Dept: OBSTETRICS AND GYNECOLOGY | Facility: CLINIC | Age: 70
End: 2025-04-17
Payer: MEDICARE

## 2025-04-28 ENCOUNTER — APPOINTMENT (OUTPATIENT)
Dept: OBSTETRICS AND GYNECOLOGY | Facility: CLINIC | Age: 70
End: 2025-04-28
Payer: MEDICARE

## 2025-06-03 DIAGNOSIS — M81.0 AGE-RELATED OSTEOPOROSIS WITHOUT CURRENT PATHOLOGICAL FRACTURE: Primary | ICD-10-CM

## 2025-06-17 ENCOUNTER — APPOINTMENT (OUTPATIENT)
Dept: PHARMACY | Facility: HOSPITAL | Age: 70
End: 2025-06-17
Payer: MEDICARE

## 2025-06-17 DIAGNOSIS — M81.0 OSTEOPOROSIS, POST-MENOPAUSAL: ICD-10-CM

## 2025-06-17 DIAGNOSIS — M81.0 AGE-RELATED OSTEOPOROSIS WITHOUT CURRENT PATHOLOGICAL FRACTURE: ICD-10-CM

## 2025-06-17 NOTE — PROGRESS NOTES
PROLIA NOTES    Prolia Injection needed  , M81.0    Clara Child is a 69 y.o. female who was referred by Sadiq Tena DO for in-office injection of Prolia.   Spoke with patient via telephone today.  With patient's permission, this is a Telemedicine visit with audio. Provider located at office address. Patient located at their home address. All issues as documented below were discussed and addressed but limited physical exam was performed. If it was felt that the patient should be evaluated via face-to-face office appointment(s) they were directed to appropriate location.     Patient verbally consented to proceed with Osteoporosis treatment/Prolia.     Labs  Calcium   Date Value Ref Range Status   11/27/2024 9.1 8.6 - 10.3 mg/dL Final     Vitamin D, 25-Hydroxy, Total   Date Value Ref Range Status   08/13/2024 27 (L) 31 - 100 ng/mL Final     eGFR   Date Value Ref Range Status   11/27/2024 80 >60 mL/min/1.73m*2 Final     Comment:     Calculations of estimated GFR are performed using the 2021 CKD-EPI Study Refit equation without the race variable for the IDMS-Traceable creatinine methods.  https://jasn.asnjournals.org/content/early/2021/09/22/ASN.3189860701        Last DEXA scan:  12/20/2023    FINDINGS:   LEFT FEMUR -TOTAL  Bone Mineral Density:0.724 g/cm2.  T-Score -2.3  Z-Score -1.0  % change vs Previous : na. % change vs Baseline baseline.      LEFT FEMUR -NECK  Bone Mineral Density:  0.672 g/cm2.  T-Score -2.6   Z-Score-1.1  % change vs Previous : na. % change vs Baseline baseline.      SPINE L1-L4  Bone Mineral Density:0.890 g/cm2.  T-Score -2.3  Z-Score -1.0  % change vs Previous : na. % change vs Baseline baseline.      World Health Organization (WHO) criteria for post-menopausal,   Women:  Normal:         T-score at or above -1 SD  Osteopenia:   T-score between -1 and -2.5 SD  Osteoporosis: T-score at or below -2.5 SD          10-year Fracture Risk:  Major Osteoporotic Fracture  15.8  Hip  Fracture                        4.4    Next dose due: 08/03/2025    Prolia injection via: [facility]  Pharmacy to be used: [facility]   Medication ordered, Post prolia bloodwork ordered     Appointment date: 08/14/2025 1:30pm        Other Notes:  Discussed cost, MOA, potential SE,  post injection bloodwork, instructed to call office after injection with any signs of hypocalcemia.     Sandra Robbins  Clinical Pharmacist  [facility]  [phone]

## 2025-06-17 NOTE — PROGRESS NOTES
MEDICATION MANAGEMENT    Clara Child is a 69 y.o. female who was referred by Sadiq Tena DO to complete medication reconciliation and review with the clinical pharmacist.  A comprehensive medication review was completed with the patient via telephone today.  With patient's permission, this is a Telemedicine visit with audio. Provider located at office address. Patient located at their home address. All issues as documented below were discussed and addressed but limited physical exam was performed. If it was felt that the patient should be evaluated via face-to-face office appointment(s) they were directed to appropriate location.  Patient reports financial barrier with current medication.      MEDICATION HISTORY  Allergies[1]  Medications Ordered Prior to Encounter[2]     Patient Assistance Screening (VAF)  Patient verbally reports monthly or yearly income which is less than 400% federal poverty level.  Application for program has been submitted for the following medications:     Prolia     Patient has been informed that program team will be reaching out to them to discuss necessary documentation, instructed to answer phone/return voicemail.   Patient aware this process may take up to 6 weeks.   If approved medication must be filled through [facility] pharmacy and may be picked up or mailed to patient.       LABS  There were no vitals taken for this visit.   Lab Results   Component Value Date    HGBA1C 5.3 09/27/2021    HGBA1C 5.6 09/13/2018     Lab Results   Component Value Date    BILITOT 0.6 11/27/2024    CALCIUM 9.1 11/27/2024    CO2 28 11/27/2024     11/27/2024    CREATININE 0.79 11/27/2024    GLUCOSE 79 11/27/2024    ALKPHOS 29 (L) 11/27/2024    K 4.4 11/27/2024    PROT 6.2 (L) 11/27/2024     11/27/2024    AST 13 11/27/2024    ALT 9 11/27/2024    BUN 14 11/27/2024    ANIONGAP 8 (L) 11/27/2024    MG 1.7 04/29/2021    PHOS 2.7 04/29/2021    ALBUMIN 4.0 11/27/2024     Lab Results    Component Value Date    TRIG 76 11/27/2024    CHOL 232 (H) 11/27/2024    LDLCALC 148 (H) 11/27/2024    HDL 68.7 11/27/2024         Assessment/Plan    Comments/Recommendations to PCP:  Reconciled medications with patient via telephone today.  Reviewed instructions, MOA, SE and dose for Prolia.  Next dose due August 3, 2025   Patient verbalizes understanding regarding plan of care and all questions answered   4.   Pt will follow up with PCP as scheduled        Sandra Robbins [facility]    Verbal consent to manage patient's drug therapy was obtained from the patient and/or an individual authorized to act on behalf of a patient. They were informed they may decline to participate or withdraw from participation in pharmacy services at any time.       [1] No Known Allergies  [2]   Current Outpatient Medications on File Prior to Visit   Medication Sig Dispense Refill    denosumab (Prolia) 60 mg/mL syringe Inject 1 mL (60 mg total) under the skin every 6 months.  To be administered in provider's office. 1 mL 1     No current facility-administered medications on file prior to visit.

## 2025-06-17 NOTE — PROGRESS NOTES
Please review for internal [employer] (VAF) eligibility. Prescriptions have been sent to [facility].     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  Household size: 1  Financial Documents To Be Collected By: Documents attached to email  Medication(s):    Prolia     *I have confirmed the above medications are listed on the current [facility] formulary: [email or web address]    I acknowledge the [facility] staff will further reach out to the patient to confirm additional details as needed, including but not limited to collecting financial documentation, confirming delivery information, obtaining payment method for non-VAF medications.      Sandra Robbins, [facility]

## 2025-06-20 PROCEDURE — RXMED WILLOW AMBULATORY MEDICATION CHARGE

## 2025-06-24 ENCOUNTER — PHARMACY VISIT (OUTPATIENT)
Dept: PHARMACY | Facility: CLINIC | Age: 70
End: 2025-06-24
Payer: COMMERCIAL

## 2025-07-15 ENCOUNTER — APPOINTMENT (OUTPATIENT)
Dept: OBSTETRICS AND GYNECOLOGY | Facility: CLINIC | Age: 70
End: 2025-07-15
Payer: MEDICARE

## 2025-08-14 ENCOUNTER — APPOINTMENT (OUTPATIENT)
Dept: PRIMARY CARE | Facility: CLINIC | Age: 70
End: 2025-08-14
Payer: MEDICARE

## 2025-08-14 VITALS — DIASTOLIC BLOOD PRESSURE: 70 MMHG | TEMPERATURE: 97.8 F | SYSTOLIC BLOOD PRESSURE: 120 MMHG

## 2025-08-14 DIAGNOSIS — M81.0 OSTEOPOROSIS, POSTMENOPAUSAL: Primary | ICD-10-CM

## 2025-12-18 ENCOUNTER — APPOINTMENT (OUTPATIENT)
Dept: PRIMARY CARE | Facility: CLINIC | Age: 70
End: 2025-12-18
Payer: MEDICARE